# Patient Record
Sex: FEMALE | Race: WHITE
[De-identification: names, ages, dates, MRNs, and addresses within clinical notes are randomized per-mention and may not be internally consistent; named-entity substitution may affect disease eponyms.]

---

## 2018-01-11 ENCOUNTER — HOSPITAL ENCOUNTER (OUTPATIENT)
Dept: HOSPITAL 47 - RADMAMWWP | Age: 53
Discharge: HOME | End: 2018-01-11
Payer: COMMERCIAL

## 2018-01-11 PROCEDURE — 86304 IMMUNOASSAY TUMOR CA 125: CPT

## 2018-01-11 PROCEDURE — 36415 COLL VENOUS BLD VENIPUNCTURE: CPT

## 2018-01-11 PROCEDURE — 77067 SCR MAMMO BI INCL CAD: CPT

## 2018-01-12 NOTE — MM
Reason for exam: screening  (asymptomatic).

Last mammogram was performed 1 year and 1 month ago.



History:

Patient is nulliparous.

Took unspecified hormones for 5 years.



Physical Findings:

A clinical breast exam by your physician is recommended on an annual basis and 

results should be correlated with mammographic findings.



MG Screening Mammo w CAD

Bilateral CC and MLO view(s) were taken.

Prior study comparison: December 1, 2016, bilateral MG screening mammo w CAD.  

November 12, 2015, bilateral MG screening mammo w CAD.

The breast tissue is heterogeneously dense. This may lower the sensitivity of 

mammography.  No suspicious abnormality in the right breast. There is a 5mm 

lateral left breast asymmetry 8.3cm from nipple likely in the superior left 

breast.





ASSESSMENT: Incomplete: need additional imaging evaluation, BI-RAD 0



RECOMMENDATION:

Special view mammogram of the left breast.



If lesion persists on supplemental views, image directed ultrasound is 

recommended.



[facility] will attempt to contact patient to return for supplemental 

views and ultrasound if indicated.

## 2018-01-23 ENCOUNTER — HOSPITAL ENCOUNTER (OUTPATIENT)
Dept: HOSPITAL 47 - RADMAMWWP | Age: 53
Discharge: HOME | End: 2018-01-23
Payer: COMMERCIAL

## 2018-01-23 DIAGNOSIS — R92.8: Primary | ICD-10-CM

## 2018-01-23 PROCEDURE — 77065 DX MAMMO INCL CAD UNI: CPT

## 2018-01-24 NOTE — MM
Reason for exam: additional evaluation requested from abnormal screening.

Last mammogram was performed less than 1 month ago.



History:

Patient is nulliparous.

Took unspecified hormones for 5 years.



Physical Findings:

Nurse did not find any significant physical abnormalities on exam.



MG Work Up Mamm w CAD LT

Spot compression CC, spot compression MLO, and ML view(s) were taken of the left 

breast.

Prior study comparison: January 11, 2018, bilateral MG screening mammo w CAD.  

December 1, 2016, bilateral MG screening mammo w CAD.

The breast tissue is heterogeneously dense. This may lower the sensitivity of 

mammography.  No suspicious abnormality. The previously seen abnormality improves 

on additional views and appears as fibroglandular tissue.



These results were verbally communicated with the patient and result sheet given 

to the patient on 1/23/18.





ASSESSMENT: Benign, BI-RAD 2



RECOMMENDATION:

Return to routine screening mammogram schedule for both breasts.

## 2018-04-10 ENCOUNTER — HOSPITAL ENCOUNTER (OUTPATIENT)
Dept: HOSPITAL 47 - LABWHC1 | Age: 53
Discharge: HOME | End: 2018-04-10
Attending: INTERNAL MEDICINE
Payer: COMMERCIAL

## 2018-04-10 DIAGNOSIS — D59.8: Primary | ICD-10-CM

## 2018-04-10 LAB
BASOPHILS # BLD AUTO: 0 K/UL (ref 0–0.2)
BASOPHILS NFR BLD AUTO: 1 %
EOSINOPHIL # BLD AUTO: 0.2 K/UL (ref 0–0.7)
EOSINOPHIL NFR BLD AUTO: 3 %
ERYTHROCYTE [DISTWIDTH] IN BLOOD BY AUTOMATED COUNT: 3.24 M/UL (ref 3.8–5.4)
ERYTHROCYTE [DISTWIDTH] IN BLOOD: 13.7 % (ref 11.5–15.5)
HCT VFR BLD AUTO: 32.5 % (ref 34–46)
HGB BLD-MCNC: 10.9 GM/DL (ref 11.4–16)
LYMPHOCYTES # SPEC AUTO: 0.9 K/UL (ref 1–4.8)
LYMPHOCYTES NFR SPEC AUTO: 18 %
MCH RBC QN AUTO: 33.5 PG (ref 25–35)
MCHC RBC AUTO-ENTMCNC: 33.4 G/DL (ref 31–37)
MCV RBC AUTO: 100.5 FL (ref 80–100)
MONOCYTES # BLD AUTO: 0.3 K/UL (ref 0–1)
MONOCYTES NFR BLD AUTO: 7 %
NEUTROPHILS # BLD AUTO: 3.4 K/UL (ref 1.3–7.7)
NEUTROPHILS NFR BLD AUTO: 69 %
PLATELET # BLD AUTO: 256 K/UL (ref 150–450)
WBC # BLD AUTO: 4.9 K/UL (ref 3.8–10.6)

## 2018-04-10 PROCEDURE — 36415 COLL VENOUS BLD VENIPUNCTURE: CPT

## 2018-04-10 PROCEDURE — 85025 COMPLETE CBC W/AUTO DIFF WBC: CPT

## 2018-10-29 ENCOUNTER — HOSPITAL ENCOUNTER (OUTPATIENT)
Dept: HOSPITAL 47 - RADCTMAIN | Age: 53
End: 2018-10-29
Payer: COMMERCIAL

## 2018-10-29 DIAGNOSIS — K57.30: Primary | ICD-10-CM

## 2018-10-29 DIAGNOSIS — R10.84: ICD-10-CM

## 2018-10-29 LAB
ALBUMIN SERPL-MCNC: 4.1 G/DL (ref 3.5–5)
ALP SERPL-CCNC: 98 U/L (ref 38–126)
ALT SERPL-CCNC: 24 U/L (ref 9–52)
ANION GAP SERPL CALC-SCNC: 7 MMOL/L
AST SERPL-CCNC: 22 U/L (ref 14–36)
BASOPHILS # BLD AUTO: 0 K/UL (ref 0–0.2)
BASOPHILS NFR BLD AUTO: 0 %
BUN SERPL-SCNC: 9 MG/DL (ref 7–17)
CALCIUM SPEC-MCNC: 9.4 MG/DL (ref 8.4–10.2)
CHLORIDE SERPL-SCNC: 108 MMOL/L (ref 98–107)
CO2 SERPL-SCNC: 25 MMOL/L (ref 22–30)
EOSINOPHIL # BLD AUTO: 0.1 K/UL (ref 0–0.7)
EOSINOPHIL NFR BLD AUTO: 1 %
ERYTHROCYTE [DISTWIDTH] IN BLOOD BY AUTOMATED COUNT: 3.37 M/UL (ref 3.8–5.4)
ERYTHROCYTE [DISTWIDTH] IN BLOOD: 14.5 % (ref 11.5–15.5)
GLUCOSE SERPL-MCNC: 90 MG/DL (ref 74–99)
HCT VFR BLD AUTO: 35.2 % (ref 34–46)
HGB BLD-MCNC: 11.6 GM/DL (ref 11.4–16)
LYMPHOCYTES # SPEC AUTO: 1.1 K/UL (ref 1–4.8)
LYMPHOCYTES NFR SPEC AUTO: 19 %
MCH RBC QN AUTO: 34.4 PG (ref 25–35)
MCHC RBC AUTO-ENTMCNC: 33 G/DL (ref 31–37)
MCV RBC AUTO: 104.2 FL (ref 80–100)
MONOCYTES # BLD AUTO: 0.3 K/UL (ref 0–1)
MONOCYTES NFR BLD AUTO: 5 %
NEUTROPHILS # BLD AUTO: 4.2 K/UL (ref 1.3–7.7)
NEUTROPHILS NFR BLD AUTO: 73 %
PLATELET # BLD AUTO: 313 K/UL (ref 150–450)
POTASSIUM SERPL-SCNC: 4.6 MMOL/L (ref 3.5–5.1)
PROT SERPL-MCNC: 7.3 G/DL (ref 6.3–8.2)
SODIUM SERPL-SCNC: 140 MMOL/L (ref 137–145)
T4 FREE SERPL-MCNC: 1.26 NG/DL (ref 0.78–2.19)
WBC # BLD AUTO: 5.7 K/UL (ref 3.8–10.6)

## 2018-10-29 PROCEDURE — 84443 ASSAY THYROID STIM HORMONE: CPT

## 2018-10-29 PROCEDURE — 80053 COMPREHEN METABOLIC PANEL: CPT

## 2018-10-29 PROCEDURE — 84439 ASSAY OF FREE THYROXINE: CPT

## 2018-10-29 PROCEDURE — 74176 CT ABD & PELVIS W/O CONTRAST: CPT

## 2018-10-29 PROCEDURE — 85652 RBC SED RATE AUTOMATED: CPT

## 2018-10-29 PROCEDURE — 85025 COMPLETE CBC W/AUTO DIFF WBC: CPT

## 2018-10-29 PROCEDURE — 86140 C-REACTIVE PROTEIN: CPT

## 2018-10-29 NOTE — CT
EXAMINATION TYPE: CT abdomen pelvis wo con

 

DATE OF EXAM: 10/29/2018

 

COMPARISON: 8/15/2016

 

HISTORY: 53-year-old female Generalized pain

 

CT DLP: 677 mGycm. Automated exposure control for dose reduction was used.

 

TECHNIQUE: Contiguous axial scanning of the abdomen and pelvis without IV contrast. Coronal and sagit
valerie reconstructions performed. 

 

FINDINGS: 

Heart normal size without pericardial effusion. Mild bronchial wall thickening in the visualized lung
s suggests bronchitis or asthma. Lung bases otherwise clear without pleural effusion.

 

Tiny hiatal hernia.

 

Noncontrast appearance of the liver shows a subtle hypodense lesion posteriorly and centrally in the 
right lobe, axial image 31 that was present on prior exam suggesting a benign etiology.

 

Gallbladder is collapsed.

 

Mild diffuse thickening of the left adrenal gland along with a 1 cm low-density nodule with attenuati
on of -20 Hounsfield units compatible with a lipid rich adrenal adenoma. Right adrenal gland, kidneys
, and pancreas show no gross abnormality on noncontrast CT. Tiny hilar splenule.

 

No dilated small bowel, free fluid, or free air.

 

Normal appendix. Some liquid stool is noted in the cecum and some prominent fluid filled small bowel 
loops in the mid and lower abdomen and pelvis.

 

Mild circumferential wall thickening of the proximal and mid sigmoid colon. While there is mild diver
ticulosis, no focal inflammatory changes seen centered over a diverticulum. Somewhat irregular appear
ance to focal thickening along the mid to distal sigmoid, axial image 89, coronal image 38, and sagit
valerie image 55 may reflect some apparent stool material.

 

Bladder urine distended. Uterus and both ovaries are visualized. There appear to be prominent follicu
lar changes in the ovaries measuring up to 2.1 cm on the left. Multilocular cystic appearance to the 
cervix measuring up to 3.4 cm, axial image 108 can be correlated with patient's symptoms and Pap smea
r results. No abnormal fluid collection in the pelvis or pelvic lymphadenopathy.

 

Bones: No osseous destructive process. Degenerative changes lower thoracic spine and thoracolumbar ju
nction.

 

 

IMPRESSION: 

1.  While there is proximal to mid sigmoid diverticulosis, inflammatory changes do not appear to be c
entered on any diverticulum. Mild circumferential wall thickening involves the proximal to mid sigmoi
d colon and there is liquid stool within mid and lower small bowel loops and cecum. Correlate for non
specific enterocolitis.

2.  Slight irregular thickened appearance focally at the mid to distal sigmoid could be due to mucosa
l redundancy or a mucosal lesion; recommend direct visualization if routine screening colonoscopy has
 not begun.

3.  Multilocular cystic appearance to the cervix measuring up to 3.4 cm. Numerous cervical nabothian 
cysts are possible. More aggressive pathology (ie, adenoma malignum) can be excluded by correlating w
ith patient's symptoms and Pap smear results.

## 2018-11-05 ENCOUNTER — HOSPITAL ENCOUNTER (OUTPATIENT)
Dept: HOSPITAL 47 - LABWHC1 | Age: 53
End: 2018-11-05
Attending: INTERNAL MEDICINE
Payer: COMMERCIAL

## 2018-11-05 DIAGNOSIS — M34.1: Primary | ICD-10-CM

## 2018-11-05 LAB
DSDNA AB SER QL: (no result)
DSDNA AB TITR SER: 7 IU/ML
PH UR: 6 [PH] (ref 5–8)
RNP: 0.5 AI
SP GR UR: 1 (ref 1–1.03)
UROBILINOGEN UR QL STRIP: <2 MG/DL (ref ?–2)

## 2018-11-05 PROCEDURE — 81003 URINALYSIS AUTO W/O SCOPE: CPT

## 2018-11-05 PROCEDURE — 86038 ANTINUCLEAR ANTIBODIES: CPT

## 2018-11-05 PROCEDURE — 86160 COMPLEMENT ANTIGEN: CPT

## 2018-11-05 PROCEDURE — 86235 NUCLEAR ANTIGEN ANTIBODY: CPT

## 2018-11-05 PROCEDURE — 36415 COLL VENOUS BLD VENIPUNCTURE: CPT

## 2018-11-05 PROCEDURE — 86039 ANTINUCLEAR ANTIBODIES (ANA): CPT

## 2018-11-05 PROCEDURE — 86225 DNA ANTIBODY NATIVE: CPT

## 2018-12-14 ENCOUNTER — HOSPITAL ENCOUNTER (OUTPATIENT)
Dept: HOSPITAL 47 - LABWHC1 | Age: 53
Discharge: HOME | End: 2018-12-14
Payer: COMMERCIAL

## 2018-12-14 DIAGNOSIS — Z13.220: Primary | ICD-10-CM

## 2018-12-14 LAB
CHOLEST SERPL-MCNC: 111 MG/DL (ref 0–200)
HDLC SERPL-MCNC: 36 MG/DL (ref 40–60)
LDLC SERPL CALC-MCNC: 52.2 MG/DL (ref 0–131)
TRIGL SERPL-MCNC: 114 MG/DL (ref 0–149)
VLDLC SERPL CALC-MCNC: 22.8 MG/DL (ref 5–40)

## 2018-12-14 PROCEDURE — 80061 LIPID PANEL: CPT

## 2018-12-14 PROCEDURE — 36415 COLL VENOUS BLD VENIPUNCTURE: CPT

## 2019-01-24 ENCOUNTER — HOSPITAL ENCOUNTER (OUTPATIENT)
Dept: HOSPITAL 47 - RADMAMWWP | Age: 54
Discharge: HOME | End: 2019-01-24
Attending: OBSTETRICS & GYNECOLOGY
Payer: COMMERCIAL

## 2019-01-24 DIAGNOSIS — Z12.31: Primary | ICD-10-CM

## 2019-01-24 DIAGNOSIS — Z80.41: ICD-10-CM

## 2019-01-24 PROCEDURE — 77063 BREAST TOMOSYNTHESIS BI: CPT

## 2019-01-24 PROCEDURE — 86304 IMMUNOASSAY TUMOR CA 125: CPT

## 2019-01-24 PROCEDURE — 77067 SCR MAMMO BI INCL CAD: CPT

## 2019-01-27 NOTE — MM
Reason for exam: screening  (asymptomatic).

Last mammogram was performed 1 year ago.



History:

Patient is nulliparous.

Took unspecified hormones for 5 years.



MG 3D Screening Mammo W/Cad

Bilateral CC and MLO view(s) were taken.

Prior study comparison: January 23, 2018, left breast MG work up mamm w CAD LT.  

January 11, 2018, bilateral MG screening mammo w CAD.

The breast tissue is heterogeneously dense. This may lower the sensitivity of 

mammography.

The patient has heterogeneously dense breast that is in a complex pattern.  No 

significant changes when compared with prior studies.





ASSESSMENT: Benign, BI-RAD 2



RECOMMENDATION:

Routine screening mammogram of both breasts in 1 year.

## 2019-04-25 ENCOUNTER — HOSPITAL ENCOUNTER (OUTPATIENT)
Dept: HOSPITAL 47 - LABWHC1 | Age: 54
Discharge: HOME | End: 2019-04-25
Attending: INTERNAL MEDICINE
Payer: COMMERCIAL

## 2019-04-25 DIAGNOSIS — M34.1: ICD-10-CM

## 2019-04-25 DIAGNOSIS — I73.00: ICD-10-CM

## 2019-04-25 DIAGNOSIS — Z86.2: ICD-10-CM

## 2019-04-25 DIAGNOSIS — D59.8: Primary | ICD-10-CM

## 2019-04-25 LAB
ALBUMIN SERPL-MCNC: 3.6 G/DL (ref 3.8–4.9)
ALP SERPL-CCNC: 99 U/L (ref 41–126)
ALT SERPL-CCNC: 10 U/L (ref 8–44)
ANION GAP SERPL CALC-SCNC: 6.9 MMOL/L (ref 4–12)
AST SERPL-CCNC: 19 U/L (ref 13–35)
BASOPHILS # BLD AUTO: 0 K/UL (ref 0–0.2)
BASOPHILS NFR BLD AUTO: 1 %
BILIRUB INDIRECT SERPL-MCNC: 0.6 MG/DL
BUN SERPL-SCNC: 11 MG/DL (ref 9–27)
CALCIUM SPEC-MCNC: 8.1 MG/DL (ref 8.7–10.3)
CHLORIDE SERPL-SCNC: 109 MMOL/L (ref 96–109)
CO2 SERPL-SCNC: 22.1 MMOL/L (ref 21.6–31.8)
EOSINOPHIL # BLD AUTO: 0.2 K/UL (ref 0–0.7)
EOSINOPHIL NFR BLD AUTO: 5 %
ERYTHROCYTE [DISTWIDTH] IN BLOOD BY AUTOMATED COUNT: 2.53 M/UL (ref 3.8–5.4)
ERYTHROCYTE [DISTWIDTH] IN BLOOD: 16.1 % (ref 11.5–15.5)
HCT VFR BLD AUTO: 26.7 % (ref 34–46)
HGB BLD-MCNC: 8.9 GM/DL (ref 11.4–16)
LYMPHOCYTES # SPEC AUTO: 1 K/UL (ref 1–4.8)
LYMPHOCYTES NFR SPEC AUTO: 26 %
MCH RBC QN AUTO: 35.4 PG (ref 25–35)
MCHC RBC AUTO-ENTMCNC: 33.5 G/DL (ref 31–37)
MCV RBC AUTO: 105.6 FL (ref 80–100)
MONOCYTES # BLD AUTO: 0.4 K/UL (ref 0–1)
MONOCYTES NFR BLD AUTO: 11 %
NEUTROPHILS # BLD AUTO: 2 K/UL (ref 1.3–7.7)
NEUTROPHILS NFR BLD AUTO: 55 %
PLATELET # BLD AUTO: 275 K/UL (ref 150–450)
POTASSIUM SERPL-SCNC: 4.6 MMOL/L (ref 3.5–5.5)
PROT SERPL-MCNC: 5.8 G/DL (ref 6.2–8.2)
SODIUM SERPL-SCNC: 138 MMOL/L (ref 135–145)
WBC # BLD AUTO: 3.7 K/UL (ref 3.8–10.6)

## 2019-04-25 PROCEDURE — 82040 ASSAY OF SERUM ALBUMIN: CPT

## 2019-04-25 PROCEDURE — 82310 ASSAY OF CALCIUM: CPT

## 2019-04-25 PROCEDURE — 84450 TRANSFERASE (AST) (SGOT): CPT

## 2019-04-25 PROCEDURE — 84460 ALANINE AMINO (ALT) (SGPT): CPT

## 2019-04-25 PROCEDURE — 80051 ELECTROLYTE PANEL: CPT

## 2019-04-25 PROCEDURE — 82565 ASSAY OF CREATININE: CPT

## 2019-04-25 PROCEDURE — 84520 ASSAY OF UREA NITROGEN: CPT

## 2019-04-25 PROCEDURE — 85652 RBC SED RATE AUTOMATED: CPT

## 2019-04-25 PROCEDURE — 86140 C-REACTIVE PROTEIN: CPT

## 2019-04-25 PROCEDURE — 84155 ASSAY OF PROTEIN SERUM: CPT

## 2019-04-25 PROCEDURE — 36415 COLL VENOUS BLD VENIPUNCTURE: CPT

## 2019-04-25 PROCEDURE — 82248 BILIRUBIN DIRECT: CPT

## 2019-04-25 PROCEDURE — 84075 ASSAY ALKALINE PHOSPHATASE: CPT

## 2019-04-25 PROCEDURE — 85025 COMPLETE CBC W/AUTO DIFF WBC: CPT

## 2019-10-04 ENCOUNTER — HOSPITAL ENCOUNTER (OUTPATIENT)
Dept: HOSPITAL 47 - LABWHC1 | Age: 54
Discharge: HOME | End: 2019-10-04
Attending: INTERNAL MEDICINE
Payer: COMMERCIAL

## 2019-10-04 DIAGNOSIS — M34.1: Primary | ICD-10-CM

## 2019-10-04 DIAGNOSIS — M32.9: ICD-10-CM

## 2019-10-04 LAB
ALBUMIN SERPL-MCNC: 3.7 G/DL (ref 3.8–4.9)
ALP SERPL-CCNC: 94 U/L (ref 41–126)
ALT SERPL-CCNC: 14 U/L (ref 8–44)
ANION GAP SERPL CALC-SCNC: 7.7 MMOL/L (ref 4–12)
AST SERPL-CCNC: 24 U/L (ref 13–35)
BILIRUB INDIRECT SERPL-MCNC: 0.5 MG/DL
BUN SERPL-SCNC: 9 MG/DL (ref 9–27)
CALCIUM SPEC-MCNC: 8.1 MG/DL (ref 8.7–10.3)
CARDIOLIPIN IGG SER-IMP: NEGATIVE
CARDIOLIPIN IGM SER-IMP: NEGATIVE
CHLORIDE SERPL-SCNC: 107 MMOL/L (ref 96–109)
CK SERPL-CCNC: 26 U/L (ref 26–186)
CO2 SERPL-SCNC: 24.3 MMOL/L (ref 21.6–31.8)
DSDNA AB SER QL: (no result)
DSDNA AB TITR SER: 6 IU/ML
ENA SM AB SER IA-ACNC: NEGATIVE
ERYTHROCYTE [DISTWIDTH] IN BLOOD BY AUTOMATED COUNT: 2.64 M/UL (ref 3.8–5.4)
ERYTHROCYTE [DISTWIDTH] IN BLOOD: 15.2 % (ref 11.5–15.5)
HCT VFR BLD AUTO: 26.6 % (ref 34–46)
HGB BLD-MCNC: 9.2 GM/DL (ref 11.4–16)
MCH RBC QN AUTO: 35 PG (ref 25–35)
MCHC RBC AUTO-ENTMCNC: 34.8 G/DL (ref 31–37)
MCV RBC AUTO: 100.5 FL (ref 80–100)
PH UR: 6 [PH] (ref 5–8)
PLATELET # BLD AUTO: 284 K/UL (ref 150–450)
POTASSIUM SERPL-SCNC: 4.2 MMOL/L (ref 3.5–5.5)
PROT SERPL-MCNC: 5.9 G/DL (ref 6.2–8.2)
PROT UR QL: (no result)
RBC UR QL: 1 /HPF (ref 0–5)
SODIUM SERPL-SCNC: 139 MMOL/L (ref 135–145)
SP GR UR: 1.01 (ref 1–1.03)
SQUAMOUS UR QL AUTO: 3 /HPF (ref 0–4)
UROBILINOGEN UR QL STRIP: <2 MG/DL (ref ?–2)
WBC # BLD AUTO: 4.1 K/UL (ref 3.8–10.6)
WBC #/AREA URNS HPF: 1 /HPF (ref 0–5)

## 2019-10-04 PROCEDURE — 86235 NUCLEAR ANTIGEN ANTIBODY: CPT

## 2019-10-04 PROCEDURE — 80051 ELECTROLYTE PANEL: CPT

## 2019-10-04 PROCEDURE — 86146 BETA-2 GLYCOPROTEIN ANTIBODY: CPT

## 2019-10-04 PROCEDURE — 84075 ASSAY ALKALINE PHOSPHATASE: CPT

## 2019-10-04 PROCEDURE — 84155 ASSAY OF PROTEIN SERUM: CPT

## 2019-10-04 PROCEDURE — 82040 ASSAY OF SERUM ALBUMIN: CPT

## 2019-10-04 PROCEDURE — 84520 ASSAY OF UREA NITROGEN: CPT

## 2019-10-04 PROCEDURE — 82310 ASSAY OF CALCIUM: CPT

## 2019-10-04 PROCEDURE — 84450 TRANSFERASE (AST) (SGOT): CPT

## 2019-10-04 PROCEDURE — 82565 ASSAY OF CREATININE: CPT

## 2019-10-04 PROCEDURE — 85027 COMPLETE CBC AUTOMATED: CPT

## 2019-10-04 PROCEDURE — 84460 ALANINE AMINO (ALT) (SGPT): CPT

## 2019-10-04 PROCEDURE — 86225 DNA ANTIBODY NATIVE: CPT

## 2019-10-04 PROCEDURE — 86140 C-REACTIVE PROTEIN: CPT

## 2019-10-04 PROCEDURE — 81001 URINALYSIS AUTO W/SCOPE: CPT

## 2019-10-04 PROCEDURE — 82248 BILIRUBIN DIRECT: CPT

## 2019-10-04 PROCEDURE — 86038 ANTINUCLEAR ANTIBODIES: CPT

## 2019-10-04 PROCEDURE — 85730 THROMBOPLASTIN TIME PARTIAL: CPT

## 2019-10-04 PROCEDURE — 85652 RBC SED RATE AUTOMATED: CPT

## 2019-10-04 PROCEDURE — 82550 ASSAY OF CK (CPK): CPT

## 2019-10-04 PROCEDURE — 36415 COLL VENOUS BLD VENIPUNCTURE: CPT

## 2019-10-04 PROCEDURE — 86160 COMPLEMENT ANTIGEN: CPT

## 2019-10-04 PROCEDURE — 86147 CARDIOLIPIN ANTIBODY EA IG: CPT

## 2019-10-04 PROCEDURE — 85613 RUSSELL VIPER VENOM DILUTED: CPT

## 2019-10-04 PROCEDURE — 86039 ANTINUCLEAR ANTIBODIES (ANA): CPT

## 2019-10-07 LAB
APTT BLD: 42 SEC(S) (ref ?–43)
SCREEN DRVVT: 41 SEC(S) (ref ?–44)

## 2019-12-09 ENCOUNTER — HOSPITAL ENCOUNTER (OUTPATIENT)
Dept: HOSPITAL 47 - LABWHC1 | Age: 54
Discharge: HOME | End: 2019-12-09
Attending: STUDENT IN AN ORGANIZED HEALTH CARE EDUCATION/TRAINING PROGRAM
Payer: COMMERCIAL

## 2019-12-09 DIAGNOSIS — I50.9: Primary | ICD-10-CM

## 2019-12-09 LAB
ANION GAP SERPL CALC-SCNC: 5.6 MMOL/L (ref 4–12)
BUN SERPL-SCNC: 23 MG/DL (ref 9–27)
BUN/CREAT SERPL: 25.56 RATIO (ref 12–20)
CALCIUM SPEC-MCNC: 9.1 MG/DL (ref 8.7–10.3)
CHLORIDE SERPL-SCNC: 104 MMOL/L (ref 96–109)
CO2 SERPL-SCNC: 26.4 MMOL/L (ref 21.6–31.8)
GLUCOSE SERPL-MCNC: 76 MG/DL (ref 70–110)
MAGNESIUM SPEC-SCNC: 1.9 MG/DL (ref 1.5–2.4)
POTASSIUM SERPL-SCNC: 4.7 MMOL/L (ref 3.5–5.5)
SODIUM SERPL-SCNC: 136 MMOL/L (ref 135–145)

## 2019-12-09 PROCEDURE — 36415 COLL VENOUS BLD VENIPUNCTURE: CPT

## 2019-12-09 PROCEDURE — 80048 BASIC METABOLIC PNL TOTAL CA: CPT

## 2019-12-09 PROCEDURE — 83735 ASSAY OF MAGNESIUM: CPT

## 2019-12-28 ENCOUNTER — HOSPITAL ENCOUNTER (OUTPATIENT)
Dept: HOSPITAL 47 - LABWHC1 | Age: 54
Discharge: HOME | End: 2019-12-28
Attending: OBSTETRICS & GYNECOLOGY
Payer: COMMERCIAL

## 2019-12-28 DIAGNOSIS — N83.209: Primary | ICD-10-CM

## 2019-12-28 PROCEDURE — 86304 IMMUNOASSAY TUMOR CA 125: CPT

## 2019-12-28 PROCEDURE — 36415 COLL VENOUS BLD VENIPUNCTURE: CPT

## 2020-01-11 ENCOUNTER — HOSPITAL ENCOUNTER (OUTPATIENT)
Dept: HOSPITAL 47 - LABWHC1 | Age: 55
Discharge: HOME | End: 2020-01-11
Payer: COMMERCIAL

## 2020-01-11 DIAGNOSIS — E03.9: Primary | ICD-10-CM

## 2020-01-11 PROCEDURE — 36415 COLL VENOUS BLD VENIPUNCTURE: CPT

## 2020-01-11 PROCEDURE — 84443 ASSAY THYROID STIM HORMONE: CPT

## 2020-01-31 ENCOUNTER — HOSPITAL ENCOUNTER (OUTPATIENT)
Dept: HOSPITAL 47 - RADMAMWWP | Age: 55
End: 2020-01-31
Attending: OBSTETRICS & GYNECOLOGY
Payer: COMMERCIAL

## 2020-01-31 DIAGNOSIS — Z12.31: Primary | ICD-10-CM

## 2020-01-31 PROCEDURE — 77063 BREAST TOMOSYNTHESIS BI: CPT

## 2020-01-31 PROCEDURE — 77067 SCR MAMMO BI INCL CAD: CPT

## 2020-02-03 NOTE — MM
Reason for exam: screening  (asymptomatic).

Last mammogram was performed 1 year ago.



History:

Patient is postmenopausal, history of other cancer, and is nulliparous.

Took unspecified hormones for 5 years.



Physical Findings:

A clinical breast exam by your physician is recommended on an annual basis and 

results should be correlated with mammographic findings.



MG 3D Screening Mammo W/Cad

Bilateral CC, MLO, and XCCL view(s) were taken.

Prior study comparison: January 24, 2019, bilateral MG 3d screening mammo w/cad.  

January 23, 2018, left breast MG work up mamm w CAD LT.

The breast tissue is heterogeneously dense. This may lower the sensitivity of 

mammography.  There is a stable left upper outer quadrant mass at posterior depth.

No suspicious abnormality.  No significant changes when compared with prior 

studies.





ASSESSMENT: Benign, BI-RAD 2



RECOMMENDATION:

Routine screening mammogram of both breasts in 1 year.

## 2020-07-20 ENCOUNTER — HOSPITAL ENCOUNTER (OUTPATIENT)
Dept: HOSPITAL 47 - LABWHC1 | Age: 55
Discharge: HOME | End: 2020-07-20
Attending: INTERNAL MEDICINE
Payer: COMMERCIAL

## 2020-07-20 DIAGNOSIS — I50.9: Primary | ICD-10-CM

## 2020-07-20 LAB
ANION GAP SERPL CALC-SCNC: 8.3 MMOL/L (ref 4–12)
BUN SERPL-SCNC: 23 MG/DL (ref 9–27)
BUN/CREAT SERPL: 16.43 RATIO (ref 12–20)
CALCIUM SPEC-MCNC: 9.6 MG/DL (ref 8.7–10.3)
CHLORIDE SERPL-SCNC: 106 MMOL/L (ref 96–109)
CO2 SERPL-SCNC: 24.7 MMOL/L (ref 21.6–31.8)
GLUCOSE SERPL-MCNC: 102 MG/DL (ref 70–110)
POTASSIUM SERPL-SCNC: 4.7 MMOL/L (ref 3.5–5.5)
SODIUM SERPL-SCNC: 139 MMOL/L (ref 135–145)

## 2020-07-20 PROCEDURE — 36415 COLL VENOUS BLD VENIPUNCTURE: CPT

## 2020-07-20 PROCEDURE — 80048 BASIC METABOLIC PNL TOTAL CA: CPT

## 2020-07-20 PROCEDURE — 83880 ASSAY OF NATRIURETIC PEPTIDE: CPT

## 2020-10-05 ENCOUNTER — HOSPITAL ENCOUNTER (OUTPATIENT)
Dept: HOSPITAL 47 - LABWHC1 | Age: 55
Discharge: HOME | End: 2020-10-05
Attending: FAMILY MEDICINE
Payer: COMMERCIAL

## 2020-10-05 DIAGNOSIS — E03.9: Primary | ICD-10-CM

## 2020-10-05 PROCEDURE — 84439 ASSAY OF FREE THYROXINE: CPT

## 2020-10-05 PROCEDURE — 36415 COLL VENOUS BLD VENIPUNCTURE: CPT

## 2020-10-05 PROCEDURE — 84443 ASSAY THYROID STIM HORMONE: CPT

## 2020-12-15 ENCOUNTER — HOSPITAL ENCOUNTER (OUTPATIENT)
Dept: HOSPITAL 47 - LABWHC1 | Age: 55
Discharge: HOME | End: 2020-12-15
Attending: OBSTETRICS & GYNECOLOGY
Payer: COMMERCIAL

## 2020-12-15 DIAGNOSIS — Z12.73: Primary | ICD-10-CM

## 2020-12-15 DIAGNOSIS — Z80.41: ICD-10-CM

## 2020-12-15 PROCEDURE — 86304 IMMUNOASSAY TUMOR CA 125: CPT

## 2020-12-15 PROCEDURE — 36415 COLL VENOUS BLD VENIPUNCTURE: CPT

## 2020-12-22 ENCOUNTER — HOSPITAL ENCOUNTER (OUTPATIENT)
Dept: HOSPITAL 47 - LABWHC1 | Age: 55
Discharge: HOME | End: 2020-12-22
Attending: INTERNAL MEDICINE
Payer: COMMERCIAL

## 2020-12-22 DIAGNOSIS — D72.819: ICD-10-CM

## 2020-12-22 DIAGNOSIS — M32.9: ICD-10-CM

## 2020-12-22 DIAGNOSIS — D64.9: Primary | ICD-10-CM

## 2020-12-22 LAB
ALBUMIN SERPL-MCNC: 4 G/DL (ref 3.8–4.9)
ALBUMIN/GLOB SERPL: 1.9 G/DL (ref 1.6–3.17)
ALP SERPL-CCNC: 77 U/L (ref 41–126)
ALT SERPL-CCNC: 29 U/L (ref 8–44)
ANION GAP SERPL CALC-SCNC: 7.2 MMOL/L (ref 4–12)
AST SERPL-CCNC: 25 U/L (ref 13–35)
BASOPHILS # BLD AUTO: 0 K/UL (ref 0–0.2)
BASOPHILS NFR BLD AUTO: 0 %
BUN SERPL-SCNC: 18 MG/DL (ref 9–27)
BUN/CREAT SERPL: 12.86 RATIO (ref 12–20)
CALCIUM SPEC-MCNC: 9.5 MG/DL (ref 8.7–10.3)
CHLORIDE SERPL-SCNC: 108 MMOL/L (ref 96–109)
CO2 SERPL-SCNC: 24.8 MMOL/L (ref 21.6–31.8)
EOSINOPHIL # BLD AUTO: 0.1 K/UL (ref 0–0.7)
EOSINOPHIL NFR BLD AUTO: 6 %
ERYTHROCYTE [DISTWIDTH] IN BLOOD BY AUTOMATED COUNT: 3.02 M/UL (ref 3.8–5.4)
ERYTHROCYTE [DISTWIDTH] IN BLOOD: 13.4 % (ref 11.5–15.5)
GLOBULIN SER CALC-MCNC: 2.1 G/DL (ref 1.6–3.3)
GLUCOSE SERPL-MCNC: 92 MG/DL (ref 70–110)
HCT VFR BLD AUTO: 27.8 % (ref 34–46)
HGB BLD-MCNC: 9.2 GM/DL (ref 11.4–16)
LDH SPEC-CCNC: 167 U/L (ref 120–246)
LYMPHOCYTES # SPEC AUTO: 0.4 K/UL (ref 1–4.8)
LYMPHOCYTES NFR SPEC AUTO: 20 %
MCH RBC QN AUTO: 30.5 PG (ref 25–35)
MCHC RBC AUTO-ENTMCNC: 33.2 G/DL (ref 31–37)
MCV RBC AUTO: 91.9 FL (ref 80–100)
MONOCYTES # BLD AUTO: 0.1 K/UL (ref 0–1)
MONOCYTES NFR BLD AUTO: 7 %
NEUTROPHILS # BLD AUTO: 1.4 K/UL (ref 1.3–7.7)
NEUTROPHILS NFR BLD AUTO: 66 %
PH UR: 5.5 [PH] (ref 5–8)
PLATELET # BLD AUTO: 171 K/UL (ref 150–450)
POTASSIUM SERPL-SCNC: 4.9 MMOL/L (ref 3.5–5.5)
PROT SERPL-MCNC: 6.1 G/DL (ref 6.2–8.2)
PROT/CREAT UR-RTO: 0.09
SODIUM SERPL-SCNC: 140 MMOL/L (ref 135–145)
SP GR UR: 1.01 (ref 1–1.03)
UROBILINOGEN UR QL STRIP: <2 MG/DL (ref ?–2)
WBC # BLD AUTO: 2.2 K/UL (ref 3.8–10.6)

## 2020-12-22 PROCEDURE — 85025 COMPLETE CBC W/AUTO DIFF WBC: CPT

## 2020-12-22 PROCEDURE — 82570 ASSAY OF URINE CREATININE: CPT

## 2020-12-22 PROCEDURE — 81003 URINALYSIS AUTO W/O SCOPE: CPT

## 2020-12-22 PROCEDURE — 83615 LACTATE (LD) (LDH) ENZYME: CPT

## 2020-12-22 PROCEDURE — 86880 COOMBS TEST DIRECT: CPT

## 2020-12-22 PROCEDURE — 84156 ASSAY OF PROTEIN URINE: CPT

## 2020-12-22 PROCEDURE — 36415 COLL VENOUS BLD VENIPUNCTURE: CPT

## 2020-12-22 PROCEDURE — 80053 COMPREHEN METABOLIC PANEL: CPT

## 2020-12-22 PROCEDURE — 83010 ASSAY OF HAPTOGLOBIN QUANT: CPT

## 2021-02-01 ENCOUNTER — HOSPITAL ENCOUNTER (OUTPATIENT)
Dept: HOSPITAL 47 - LABPAT | Age: 56
Discharge: HOME | End: 2021-02-01
Attending: OBSTETRICS & GYNECOLOGY
Payer: COMMERCIAL

## 2021-02-01 DIAGNOSIS — Z01.818: Primary | ICD-10-CM

## 2021-02-01 LAB
BASOPHILS # BLD AUTO: 0 K/UL (ref 0–0.2)
BASOPHILS NFR BLD AUTO: 0 %
EOSINOPHIL # BLD AUTO: 0 K/UL (ref 0–0.7)
EOSINOPHIL NFR BLD AUTO: 2 %
ERYTHROCYTE [DISTWIDTH] IN BLOOD BY AUTOMATED COUNT: 2.97 M/UL (ref 3.8–5.4)
ERYTHROCYTE [DISTWIDTH] IN BLOOD: 13.6 % (ref 11.5–15.5)
HCT VFR BLD AUTO: 27.2 % (ref 34–46)
HGB BLD-MCNC: 9.2 GM/DL (ref 11.4–16)
LYMPHOCYTES # SPEC AUTO: 0.3 K/UL (ref 1–4.8)
LYMPHOCYTES NFR SPEC AUTO: 11 %
MCH RBC QN AUTO: 31 PG (ref 25–35)
MCHC RBC AUTO-ENTMCNC: 33.9 G/DL (ref 31–37)
MCV RBC AUTO: 91.4 FL (ref 80–100)
MONOCYTES # BLD AUTO: 0.2 K/UL (ref 0–1)
MONOCYTES NFR BLD AUTO: 7 %
NEUTROPHILS # BLD AUTO: 2 K/UL (ref 1.3–7.7)
NEUTROPHILS NFR BLD AUTO: 77 %
PLATELET # BLD AUTO: 202 K/UL (ref 150–450)
WBC # BLD AUTO: 2.6 K/UL (ref 3.8–10.6)

## 2021-02-01 PROCEDURE — 85025 COMPLETE CBC W/AUTO DIFF WBC: CPT

## 2021-02-05 ENCOUNTER — HOSPITAL ENCOUNTER (OUTPATIENT)
Dept: HOSPITAL 47 - LABWHC1 | Age: 56
Discharge: HOME | End: 2021-02-05
Attending: INTERNAL MEDICINE
Payer: COMMERCIAL

## 2021-02-05 DIAGNOSIS — M32.14: Primary | ICD-10-CM

## 2021-02-05 DIAGNOSIS — R79.89: ICD-10-CM

## 2021-02-05 LAB
ANION GAP SERPL CALC-SCNC: 8.4 MMOL/L (ref 4–12)
BUN SERPL-SCNC: 27 MG/DL (ref 9–27)
BUN/CREAT SERPL: 19.29 RATIO (ref 12–20)
CALCIUM SPEC-MCNC: 9 MG/DL (ref 8.7–10.3)
CHLORIDE SERPL-SCNC: 107 MMOL/L (ref 96–109)
CO2 SERPL-SCNC: 21.6 MMOL/L (ref 21.6–31.8)
ERYTHROCYTE [DISTWIDTH] IN BLOOD BY AUTOMATED COUNT: 2.58 X 10*6/UL (ref 4.1–5.2)
ERYTHROCYTE [DISTWIDTH] IN BLOOD: 14 % (ref 11.5–14.5)
GLUCOSE SERPL-MCNC: 102 MG/DL (ref 70–110)
HCT VFR BLD AUTO: 26 % (ref 37.2–46.3)
HGB BLD-MCNC: 8.7 G/DL (ref 12–15)
MCH RBC QN AUTO: 33.7 PG (ref 27–32)
MCHC RBC AUTO-ENTMCNC: 33.5 G/DL (ref 32–37)
MCV RBC AUTO: 100.8 FL (ref 80–97)
PH UR: 5 [PH] (ref 5–8)
PLATELET # BLD AUTO: 208 X 10*3/UL (ref 140–440)
POTASSIUM SERPL-SCNC: 4.7 MMOL/L (ref 3.5–5.5)
SODIUM SERPL-SCNC: 137 MMOL/L (ref 135–145)
SP GR UR: 1.01 (ref 1–1.03)
UROBILINOGEN UR QL STRIP: <2 MG/DL (ref ?–2)
WBC # BLD AUTO: 3.71 X 10*3/UL (ref 4.5–10)

## 2021-02-05 PROCEDURE — 82570 ASSAY OF URINE CREATININE: CPT

## 2021-02-05 PROCEDURE — 85027 COMPLETE CBC AUTOMATED: CPT

## 2021-02-05 PROCEDURE — 81003 URINALYSIS AUTO W/O SCOPE: CPT

## 2021-02-05 PROCEDURE — 36415 COLL VENOUS BLD VENIPUNCTURE: CPT

## 2021-02-05 PROCEDURE — 80048 BASIC METABOLIC PNL TOTAL CA: CPT

## 2021-02-05 PROCEDURE — 84156 ASSAY OF PROTEIN URINE: CPT

## 2021-02-07 NOTE — P.HPOB
History of Present Illness


H&P Date: 21


Chief Complaint: High-grade cervical dysplasia





This patient is a pleasant 55-year-old  0 para 0 female who has a history

of a LEEP approximately 10 years ago who is been followed for abnormal Paps.  

Patient most recently had a repeat colposcopy which showed high-grade dysplasia 

(SHUBHAM-2 of the ectocervix and low-grade dysplasia of the endocervix.  Patient now

presents for repeat LEEP excision of this area.





Review of Systems


Genitourinary: Reports as per HPI





Past Medical History


Past Medical History: Heart Failure, GERD/Reflux, Hypertension, Rheumatoid 

Arthritis (RA), Thyroid Disorder


Additional Past Medical History / Comment(s): LUPUS-SEVERAL TYPES-HAD CHEMO.  

RAYNAUD'S.  ABN. PAP.  HEMOLYTIC ANEMIA


History of Any Multi-Drug Resistant Organisms: None Reported


Past Surgical History: Cholecystectomy


Additional Past Surgical History / Comment(s): COLONOSCOPY.  LEEP


Past Anesthesia/Blood Transfusion Reactions: No Reported Reaction


Past Psychological History: No Psychological Hx Reported


Smoking Status: Former smoker


Past Alcohol Use History: None Reported


Past Drug Use History: None Reported





- Past Family History


  ** Father


Family Medical History: Cancer





  ** Brother(s)


Family Medical History: Cancer





  ** Sister(s)


Family Medical History: Cancer





Medications and Allergies


                                Home Medications











 Medication  Instructions  Recorded  Confirmed  Type


 


Belimumab [Benlysta] 200 mg SQ WEEKLY 21 History


 


Calcium Carbonate [Calcium] 600 mg PO DAILY 21 History


 


Carvedilol [Coreg] 12.5 mg PO BID 21 History


 


Cholecalciferol [Vitamin D3 (25 50 mcg PO DAILY 21 History





Mcg = 1000 Iu)]    


 


Folic Acid 1 mg PO DAILY 21 History


 


Furosemide [Lasix] 40 mg PO MOWEFR 21 History


 


Hydroxychloroquine Sulfate 300 mg PO DAILY 21 History





[Plaquenil]    


 


Levothyroxine Sodium [Synthroid] 125 mcg PO DAILY 21 History


 


Omeprazole [PriLOSEC] 20 mg PO AC-BRKFST 21 History


 


lisinopriL 40 mg PO DAILY 21 History


 


mycophenolate mofetiL [Cellcept] 1,000 mg PO BID 21 History


 


predniSONE 5 mg PO DAILY 21 History








                                    Allergies











Allergy/AdvReac Type Severity Reaction Status Date / Time


 


No Known Allergies Allergy   Verified 21 11:17














Exam





- OBG Physical Exam


Abdomen: bowel sounds normal, no diffuse tenderness, no bruit present, no 

guarding noted, no hepatomegaly, no splenomegaly, no mass


Vulva: both: normal


Vagina: normal moisture, no discharge


Cervix: 





Patient had mild acetowhite changes at 11:00.


Cervix: no lesion, no discharge


Uterus: normal size, normal contour





Results





Colposcopy on  showed low-grade dysplasia of the endocervix and high-

grade dysplasia of the ectocervix at 11:00.





Assessment and Plan


Assessment: 





This is a pleasant 55-year-old -year-old para 0 female with high-grade 

ectocervical dysplasia who is presenting for colposcopy with LEEP excision of 

the ectocervix and endocervix.  Patient I have discussed the surgery and risks 

including risk of infection, bleeding.  All the patient's questions have been 

answered and a written consent is obtained.


(1) High grade squamous intraepithelial lesion (HGSIL), grade 2 SHUBHAM, on biopsy 

of cervix


Status: Acute   Code(s): N87.1 - MODERATE CERVICAL DYSPLASIA   SNOMED Code(s): 

732685550

## 2021-02-08 ENCOUNTER — HOSPITAL ENCOUNTER (OUTPATIENT)
Dept: HOSPITAL 47 - OR | Age: 56
Discharge: HOME | End: 2021-02-08
Attending: OBSTETRICS & GYNECOLOGY
Payer: COMMERCIAL

## 2021-02-08 VITALS — SYSTOLIC BLOOD PRESSURE: 113 MMHG | HEART RATE: 78 BPM | DIASTOLIC BLOOD PRESSURE: 76 MMHG

## 2021-02-08 VITALS — RESPIRATION RATE: 16 BRPM

## 2021-02-08 VITALS — BODY MASS INDEX: 28.3 KG/M2

## 2021-02-08 VITALS — TEMPERATURE: 97.2 F

## 2021-02-08 DIAGNOSIS — I11.0: ICD-10-CM

## 2021-02-08 DIAGNOSIS — Z79.52: ICD-10-CM

## 2021-02-08 DIAGNOSIS — Z92.21: ICD-10-CM

## 2021-02-08 DIAGNOSIS — K21.9: ICD-10-CM

## 2021-02-08 DIAGNOSIS — E07.9: ICD-10-CM

## 2021-02-08 DIAGNOSIS — Z80.9: ICD-10-CM

## 2021-02-08 DIAGNOSIS — Z87.891: ICD-10-CM

## 2021-02-08 DIAGNOSIS — Z98.890: ICD-10-CM

## 2021-02-08 DIAGNOSIS — N87.9: ICD-10-CM

## 2021-02-08 DIAGNOSIS — Z79.1: ICD-10-CM

## 2021-02-08 DIAGNOSIS — I73.00: ICD-10-CM

## 2021-02-08 DIAGNOSIS — Z79.899: ICD-10-CM

## 2021-02-08 DIAGNOSIS — D58.9: ICD-10-CM

## 2021-02-08 DIAGNOSIS — Z90.49: ICD-10-CM

## 2021-02-08 DIAGNOSIS — Z79.890: ICD-10-CM

## 2021-02-08 DIAGNOSIS — I50.9: ICD-10-CM

## 2021-02-08 DIAGNOSIS — N87.1: Primary | ICD-10-CM

## 2021-02-08 DIAGNOSIS — M32.8: ICD-10-CM

## 2021-02-08 DIAGNOSIS — M06.9: ICD-10-CM

## 2021-02-08 LAB — GLUCOSE BLD-MCNC: 85 MG/DL (ref 75–99)

## 2021-02-08 PROCEDURE — 88305 TISSUE EXAM BY PATHOLOGIST: CPT

## 2021-02-08 PROCEDURE — 88307 TISSUE EXAM BY PATHOLOGIST: CPT

## 2021-02-08 PROCEDURE — 57461 CONZ OF CERVIX W/SCOPE LEEP: CPT

## 2021-02-08 PROCEDURE — 81025 URINE PREGNANCY TEST: CPT

## 2021-02-08 NOTE — P.OP
Date of Procedure: 21


Preoperative Diagnosis: 


High-grade ectocervical dysplasia


Postoperative Diagnosis: 


Same


Procedure(s) Performed: 


Colposcopy with excision of the ectocervix endocervix.


Anesthesia: MAC


Surgeon: William Dodson


Estimated Blood Loss (ml): 5


Urine output (ml): 15


Pathology: other (The cervix and endocervix)


Condition: stable


Disposition: PACU


Indications for Procedure: 


Please see dictated H&P for intimate details of this patient's admission.  Brief

summary this is a pleasant 55-year-old  0 para 0 female who presents for 

colposcopy with LEEP excision of the cervix secondary to recurrent cervical 

dysplasia, high-grade of the ectocervix.  Patient and I have discussed the 

surgery and risks including risks of infection and bleeding.  All the patient's 

questions have been answered and a written consent is obtained.


Operative Findings: 


This patient had a atrophic cervix with previous surgical changes.


Description of Procedure: 


This patient is taken to the operating room where she is laid in the supine 

position.  She subsequent undergoes general mask anesthesia without incident.  

With an adequate level of anesthesia she's placed in dorsal lithotomy position. 

She has a vaginal, perineal prep and drape.  At this time the bladder is drained

for 15 mL of clear urine.  The laser speculum was then placed in the vagina.  

Colposcopy is then performed and no significant ectocervical lesions are noted. 

Patient did have previous LEEP in the past cervix is thick and stenotic.  Cervix

is also not very large.  With this reasoning, I take the small LEEP loop and 

make several passes of the ectocervix removing the entire ectocervical portion 

near the transformation zone.  A second pass is made deeply of the endocervix 

and this is sent separately.  There is minimal bleeding.  Using the cautery I 

then cauterized entire margins and endocervical bed.  There is again barely any 

bleeding therefore Monsel solution is applied for added hemostasis.  This point 

the procedure is ended.  The was removed.  All counts correct 3.  There are no 

complications.  Patient is awakened from anesthesia and taken recovery room 

satisfactory condition.

## 2021-02-09 ENCOUNTER — HOSPITAL ENCOUNTER (OUTPATIENT)
Dept: HOSPITAL 47 - RADMAMWWP | Age: 56
Discharge: HOME | End: 2021-02-09
Attending: OBSTETRICS & GYNECOLOGY
Payer: COMMERCIAL

## 2021-02-09 DIAGNOSIS — Z12.31: Primary | ICD-10-CM

## 2021-02-09 PROCEDURE — 77067 SCR MAMMO BI INCL CAD: CPT

## 2021-02-09 PROCEDURE — 77063 BREAST TOMOSYNTHESIS BI: CPT

## 2021-02-12 NOTE — MM
Reason for exam: screening  (asymptomatic).

Last mammogram was performed 1 year ago.



History:

Patient is postmenopausal, history of other cancer, and is nulliparous.

Family history of breast cancer in sister at age 62.

Took unspecified hormones for 5 years.



Physical Findings:

A clinical breast exam by your physician is recommended on an annual basis and 

results should be correlated with mammographic findings.



MG 3D Screening Mammo W/Cad

Bilateral CC and MLO view(s) were taken.

Prior study comparison: January 31, 2020, bilateral MG 3d screening mammo w/cad.  

January 24, 2019, bilateral MG 3d screening mammo w/cad.

The breast tissue is heterogeneously dense. This may lower the sensitivity of 

mammography.  There is chronic nodularity in the left breast posterior upper outer

quadrant.  No significant changes when compared with prior studies.





ASSESSMENT: Benign, BI-RAD 2



RECOMMENDATION:

Routine screening mammogram of both breasts in 1 year.

## 2021-02-19 ENCOUNTER — HOSPITAL ENCOUNTER (OUTPATIENT)
Dept: HOSPITAL 47 - LABWHC1 | Age: 56
Discharge: HOME | End: 2021-02-19
Payer: COMMERCIAL

## 2021-02-19 DIAGNOSIS — M32.9: Primary | ICD-10-CM

## 2021-02-19 DIAGNOSIS — Z51.81: ICD-10-CM

## 2021-02-19 LAB
ANION GAP SERPL CALC-SCNC: 8.4 MMOL/L (ref 4–12)
BASOPHILS # BLD AUTO: 0.02 X 10*3/UL (ref 0–0.1)
BASOPHILS NFR BLD AUTO: 0.5 %
BUN SERPL-SCNC: 16 MG/DL (ref 9–27)
BUN/CREAT SERPL: 13.33 RATIO (ref 12–20)
CALCIUM SPEC-MCNC: 9.2 MG/DL (ref 8.7–10.3)
CHLORIDE SERPL-SCNC: 103 MMOL/L (ref 96–109)
CO2 SERPL-SCNC: 26.6 MMOL/L (ref 21.6–31.8)
EOSINOPHIL # BLD AUTO: 0.03 X 10*3/UL (ref 0.04–0.35)
EOSINOPHIL NFR BLD AUTO: 0.7 %
ERYTHROCYTE [DISTWIDTH] IN BLOOD BY AUTOMATED COUNT: 2.8 X 10*6/UL (ref 4.1–5.2)
ERYTHROCYTE [DISTWIDTH] IN BLOOD: 14.6 % (ref 11.5–14.5)
GLUCOSE SERPL-MCNC: 119 MG/DL (ref 70–110)
HCT VFR BLD AUTO: 28.6 % (ref 37.2–46.3)
HGB BLD-MCNC: 9.2 G/DL (ref 12–15)
LYMPHOCYTES # SPEC AUTO: 0.27 X 10*3/UL (ref 0.9–5)
LYMPHOCYTES NFR SPEC AUTO: 6.3 %
MCH RBC QN AUTO: 32.9 PG (ref 27–32)
MCHC RBC AUTO-ENTMCNC: 32.2 G/DL (ref 32–37)
MCV RBC AUTO: 102.1 FL (ref 80–97)
MONOCYTES # BLD AUTO: 0.31 X 10*3/UL (ref 0.2–1)
MONOCYTES NFR BLD AUTO: 7.2 %
NEUTROPHILS # BLD AUTO: 3.64 X 10*3/UL (ref 1.8–7.7)
NEUTROPHILS NFR BLD AUTO: 84.8 %
PLATELET # BLD AUTO: 228 X 10*3/UL (ref 140–440)
POTASSIUM SERPL-SCNC: 4.2 MMOL/L (ref 3.5–5.5)
SODIUM SERPL-SCNC: 138 MMOL/L (ref 135–145)
WBC # BLD AUTO: 4.29 X 10*3/UL (ref 4.5–10)

## 2021-02-19 PROCEDURE — 36415 COLL VENOUS BLD VENIPUNCTURE: CPT

## 2021-02-19 PROCEDURE — 85025 COMPLETE CBC W/AUTO DIFF WBC: CPT

## 2021-02-19 PROCEDURE — 80048 BASIC METABOLIC PNL TOTAL CA: CPT

## 2021-03-03 ENCOUNTER — HOSPITAL ENCOUNTER (OUTPATIENT)
Dept: HOSPITAL 47 - RADUSWWP | Age: 56
End: 2021-03-03
Attending: INTERNAL MEDICINE
Payer: COMMERCIAL

## 2021-03-03 DIAGNOSIS — R79.89: Primary | ICD-10-CM

## 2021-03-03 LAB
ALBUMIN SERPL-MCNC: 3.7 G/DL (ref 3.5–5)
ALP SERPL-CCNC: 56 U/L (ref 38–126)
ALT SERPL-CCNC: 14 U/L (ref 4–34)
ANION GAP SERPL CALC-SCNC: 11 MMOL/L
AST SERPL-CCNC: 18 U/L (ref 14–36)
BASOPHILS # BLD AUTO: 0 K/UL (ref 0–0.2)
BASOPHILS NFR BLD AUTO: 0 %
BUN SERPL-SCNC: 14 MG/DL (ref 7–17)
CALCIUM SPEC-MCNC: 8.9 MG/DL (ref 8.4–10.2)
CHLORIDE SERPL-SCNC: 105 MMOL/L (ref 98–107)
CO2 SERPL-SCNC: 22 MMOL/L (ref 22–30)
DSDNA AB SER QL: NEGATIVE
DSDNA AB TITR SER: 4 IU/ML
EOSINOPHIL # BLD AUTO: 0 K/UL (ref 0–0.7)
EOSINOPHIL NFR BLD AUTO: 1 %
ERYTHROCYTE [DISTWIDTH] IN BLOOD BY AUTOMATED COUNT: 2.84 M/UL (ref 3.8–5.4)
ERYTHROCYTE [DISTWIDTH] IN BLOOD: 14.4 % (ref 11.5–15.5)
GLUCOSE SERPL-MCNC: 111 MG/DL (ref 74–99)
HCT VFR BLD AUTO: 27.1 % (ref 34–46)
HGB BLD-MCNC: 8.7 GM/DL (ref 11.4–16)
LYMPHOCYTES # SPEC AUTO: 0.4 K/UL (ref 1–4.8)
LYMPHOCYTES NFR SPEC AUTO: 8 %
MCH RBC QN AUTO: 30.8 PG (ref 25–35)
MCHC RBC AUTO-ENTMCNC: 32.2 G/DL (ref 31–37)
MCV RBC AUTO: 95.6 FL (ref 80–100)
MONOCYTES # BLD AUTO: 0.3 K/UL (ref 0–1)
MONOCYTES NFR BLD AUTO: 6 %
NEUTROPHILS # BLD AUTO: 3.6 K/UL (ref 1.3–7.7)
NEUTROPHILS NFR BLD AUTO: 84 %
PH UR: 5.5 [PH] (ref 5–8)
PLATELET # BLD AUTO: 183 K/UL (ref 150–450)
POTASSIUM SERPL-SCNC: 4.5 MMOL/L (ref 3.5–5.1)
PROT SERPL-MCNC: 6.1 G/DL (ref 6.3–8.2)
PROT/CREAT UR-RTO: 0.68
RBC UR QL: 1 /HPF (ref 0–5)
SODIUM SERPL-SCNC: 138 MMOL/L (ref 137–145)
SP GR UR: 1 (ref 1–1.03)
SQUAMOUS UR QL AUTO: 1 /HPF (ref 0–4)
UROBILINOGEN UR QL STRIP: <2 MG/DL (ref ?–2)
WBC # BLD AUTO: 4.3 K/UL (ref 3.8–10.6)
WBC #/AREA URNS HPF: 5 /HPF (ref 0–5)

## 2021-03-03 PROCEDURE — 84156 ASSAY OF PROTEIN URINE: CPT

## 2021-03-03 PROCEDURE — 80053 COMPREHEN METABOLIC PANEL: CPT

## 2021-03-03 PROCEDURE — 85025 COMPLETE CBC W/AUTO DIFF WBC: CPT

## 2021-03-03 PROCEDURE — 76770 US EXAM ABDO BACK WALL COMP: CPT

## 2021-03-03 PROCEDURE — 81001 URINALYSIS AUTO W/SCOPE: CPT

## 2021-03-03 PROCEDURE — 86160 COMPLEMENT ANTIGEN: CPT

## 2021-03-03 PROCEDURE — 82570 ASSAY OF URINE CREATININE: CPT

## 2021-03-03 PROCEDURE — 88108 CYTOPATH CONCENTRATE TECH: CPT

## 2021-03-03 PROCEDURE — 86225 DNA ANTIBODY NATIVE: CPT

## 2021-03-03 NOTE — US
EXAMINATION TYPE: US kidneys/renal and bladder

 

DATE OF EXAM: 3/3/2021

 

COMPARISON: None

 

CLINICAL HISTORY: 55-year-old female R79.89 Elevated creatinine.

 

TECHNIQUE: Multiple sonographic images of the kidneys and bladder are obtained.

 

EXAM MEASUREMENTS:

 

Right Kidney:  9.7 x 4.4 x 5.0 cm. A few images suggest fullness of the right pelvicalyceal system.

Left Kidney: 11.1 x 5.1 x 4.8 cm without hydronephrosis.

 

No hydronephrosis on either side.

 

 

Sonographer notes: Arterial and venous flow demonstrated bilaterally.

 

 

 

Bladder: Partial distention of the bladder limits its evaluation.

**Bilateral Jets seen: no

 

 

 

IMPRESSION:

 

Mild right-sided pelvicaliectasis may be transient. Short interval follow-up ultrasound and assessmen
t for any right-sided renal colic symptoms to exclude early hydronephrosis.

## 2021-07-30 ENCOUNTER — HOSPITAL ENCOUNTER (OUTPATIENT)
Dept: HOSPITAL 47 - LABWHC1 | Age: 56
Discharge: HOME | End: 2021-07-30
Attending: FAMILY MEDICINE
Payer: COMMERCIAL

## 2021-07-30 DIAGNOSIS — E03.9: Primary | ICD-10-CM

## 2021-07-30 PROCEDURE — 36415 COLL VENOUS BLD VENIPUNCTURE: CPT

## 2021-07-30 PROCEDURE — 84443 ASSAY THYROID STIM HORMONE: CPT
